# Patient Record
Sex: MALE | Race: OTHER | NOT HISPANIC OR LATINO | URBAN - METROPOLITAN AREA
[De-identification: names, ages, dates, MRNs, and addresses within clinical notes are randomized per-mention and may not be internally consistent; named-entity substitution may affect disease eponyms.]

---

## 2020-09-11 ENCOUNTER — EMERGENCY (EMERGENCY)
Facility: HOSPITAL | Age: 20
LOS: 1 days | Discharge: HOME | End: 2020-09-11
Attending: EMERGENCY MEDICINE | Admitting: EMERGENCY MEDICINE
Payer: COMMERCIAL

## 2020-09-11 VITALS
DIASTOLIC BLOOD PRESSURE: 79 MMHG | TEMPERATURE: 99 F | OXYGEN SATURATION: 98 % | HEART RATE: 76 BPM | HEIGHT: 66 IN | RESPIRATION RATE: 20 BRPM | SYSTOLIC BLOOD PRESSURE: 120 MMHG | WEIGHT: 130.07 LBS

## 2020-09-11 VITALS
HEART RATE: 85 BPM | OXYGEN SATURATION: 98 % | DIASTOLIC BLOOD PRESSURE: 59 MMHG | SYSTOLIC BLOOD PRESSURE: 101 MMHG | RESPIRATION RATE: 17 BRPM

## 2020-09-11 DIAGNOSIS — T78.1XXA OTHER ADVERSE FOOD REACTIONS, NOT ELSEWHERE CLASSIFIED, INITIAL ENCOUNTER: ICD-10-CM

## 2020-09-11 LAB
ALBUMIN SERPL ELPH-MCNC: 4.6 G/DL — SIGNIFICANT CHANGE UP (ref 3.3–5)
ALP SERPL-CCNC: 95 U/L — SIGNIFICANT CHANGE UP (ref 40–120)
ALT FLD-CCNC: 49 U/L — HIGH (ref 10–45)
ANION GAP SERPL CALC-SCNC: 11 MMOL/L — SIGNIFICANT CHANGE UP (ref 5–17)
AST SERPL-CCNC: 31 U/L — SIGNIFICANT CHANGE UP (ref 10–40)
BASOPHILS # BLD AUTO: 0.02 K/UL — SIGNIFICANT CHANGE UP (ref 0–0.2)
BASOPHILS NFR BLD AUTO: 0.3 % — SIGNIFICANT CHANGE UP (ref 0–2)
BILIRUB SERPL-MCNC: 0.8 MG/DL — SIGNIFICANT CHANGE UP (ref 0.2–1.2)
BUN SERPL-MCNC: 11 MG/DL — SIGNIFICANT CHANGE UP (ref 7–23)
CALCIUM SERPL-MCNC: 9.4 MG/DL — SIGNIFICANT CHANGE UP (ref 8.4–10.5)
CHLORIDE SERPL-SCNC: 102 MMOL/L — SIGNIFICANT CHANGE UP (ref 96–108)
CO2 SERPL-SCNC: 29 MMOL/L — SIGNIFICANT CHANGE UP (ref 22–31)
CREAT SERPL-MCNC: 0.96 MG/DL — SIGNIFICANT CHANGE UP (ref 0.5–1.3)
EOSINOPHIL # BLD AUTO: 0.17 K/UL — SIGNIFICANT CHANGE UP (ref 0–0.5)
EOSINOPHIL NFR BLD AUTO: 2.1 % — SIGNIFICANT CHANGE UP (ref 0–6)
GLUCOSE SERPL-MCNC: 92 MG/DL — SIGNIFICANT CHANGE UP (ref 70–99)
HCT VFR BLD CALC: 44.8 % — SIGNIFICANT CHANGE UP (ref 39–50)
HGB BLD-MCNC: 15 G/DL — SIGNIFICANT CHANGE UP (ref 13–17)
IMM GRANULOCYTES NFR BLD AUTO: 0.3 % — SIGNIFICANT CHANGE UP (ref 0–1.5)
LYMPHOCYTES # BLD AUTO: 2.66 K/UL — SIGNIFICANT CHANGE UP (ref 1–3.3)
LYMPHOCYTES # BLD AUTO: 33.5 % — SIGNIFICANT CHANGE UP (ref 13–44)
MCHC RBC-ENTMCNC: 29.9 PG — SIGNIFICANT CHANGE UP (ref 27–34)
MCHC RBC-ENTMCNC: 33.5 GM/DL — SIGNIFICANT CHANGE UP (ref 32–36)
MCV RBC AUTO: 89.4 FL — SIGNIFICANT CHANGE UP (ref 80–100)
MONOCYTES # BLD AUTO: 0.51 K/UL — SIGNIFICANT CHANGE UP (ref 0–0.9)
MONOCYTES NFR BLD AUTO: 6.4 % — SIGNIFICANT CHANGE UP (ref 2–14)
NEUTROPHILS # BLD AUTO: 4.55 K/UL — SIGNIFICANT CHANGE UP (ref 1.8–7.4)
NEUTROPHILS NFR BLD AUTO: 57.4 % — SIGNIFICANT CHANGE UP (ref 43–77)
NRBC # BLD: 0 /100 WBCS — SIGNIFICANT CHANGE UP (ref 0–0)
PLATELET # BLD AUTO: 242 K/UL — SIGNIFICANT CHANGE UP (ref 150–400)
POTASSIUM SERPL-MCNC: 4.2 MMOL/L — SIGNIFICANT CHANGE UP (ref 3.5–5.3)
POTASSIUM SERPL-SCNC: 4.2 MMOL/L — SIGNIFICANT CHANGE UP (ref 3.5–5.3)
PROT SERPL-MCNC: 7.4 G/DL — SIGNIFICANT CHANGE UP (ref 6–8.3)
RBC # BLD: 5.01 M/UL — SIGNIFICANT CHANGE UP (ref 4.2–5.8)
RBC # FLD: 13 % — SIGNIFICANT CHANGE UP (ref 10.3–14.5)
SODIUM SERPL-SCNC: 142 MMOL/L — SIGNIFICANT CHANGE UP (ref 135–145)
WBC # BLD: 7.93 K/UL — SIGNIFICANT CHANGE UP (ref 3.8–10.5)
WBC # FLD AUTO: 7.93 K/UL — SIGNIFICANT CHANGE UP (ref 3.8–10.5)

## 2020-09-11 PROCEDURE — 96374 THER/PROPH/DIAG INJ IV PUSH: CPT

## 2020-09-11 PROCEDURE — 99291 CRITICAL CARE FIRST HOUR: CPT | Mod: 25

## 2020-09-11 PROCEDURE — 85025 COMPLETE CBC W/AUTO DIFF WBC: CPT

## 2020-09-11 PROCEDURE — 96372 THER/PROPH/DIAG INJ SC/IM: CPT | Mod: XU

## 2020-09-11 PROCEDURE — 96375 TX/PRO/DX INJ NEW DRUG ADDON: CPT

## 2020-09-11 PROCEDURE — 36415 COLL VENOUS BLD VENIPUNCTURE: CPT

## 2020-09-11 PROCEDURE — 82962 GLUCOSE BLOOD TEST: CPT

## 2020-09-11 PROCEDURE — 99291 CRITICAL CARE FIRST HOUR: CPT

## 2020-09-11 PROCEDURE — 80053 COMPREHEN METABOLIC PANEL: CPT

## 2020-09-11 RX ORDER — DIPHENHYDRAMINE HCL 50 MG
50 CAPSULE ORAL ONCE
Refills: 0 | Status: COMPLETED | OUTPATIENT
Start: 2020-09-11 | End: 2020-09-11

## 2020-09-11 RX ORDER — DIPHENHYDRAMINE HCL 50 MG
1 CAPSULE ORAL
Qty: 15 | Refills: 0
Start: 2020-09-11 | End: 2020-09-15

## 2020-09-11 RX ORDER — DEXAMETHASONE 0.5 MG/5ML
10 ELIXIR ORAL ONCE
Refills: 0 | Status: COMPLETED | OUTPATIENT
Start: 2020-09-11 | End: 2020-09-11

## 2020-09-11 RX ORDER — EPINEPHRINE 0.3 MG/.3ML
0.3 INJECTION INTRAMUSCULAR; SUBCUTANEOUS ONCE
Refills: 0 | Status: COMPLETED | OUTPATIENT
Start: 2020-09-11 | End: 2020-09-11

## 2020-09-11 RX ORDER — SODIUM CHLORIDE 9 MG/ML
1000 INJECTION INTRAMUSCULAR; INTRAVENOUS; SUBCUTANEOUS ONCE
Refills: 0 | Status: COMPLETED | OUTPATIENT
Start: 2020-09-11 | End: 2020-09-11

## 2020-09-11 RX ORDER — EPINEPHRINE 0.3 MG/.3ML
0.3 INJECTION INTRAMUSCULAR; SUBCUTANEOUS
Qty: 0.3 | Refills: 0
Start: 2020-09-11 | End: 2020-09-11

## 2020-09-11 RX ORDER — FAMOTIDINE 10 MG/ML
1 INJECTION INTRAVENOUS
Qty: 10 | Refills: 0
Start: 2020-09-11 | End: 2020-09-15

## 2020-09-11 RX ORDER — FAMOTIDINE 10 MG/ML
20 INJECTION INTRAVENOUS ONCE
Refills: 0 | Status: COMPLETED | OUTPATIENT
Start: 2020-09-11 | End: 2020-09-11

## 2020-09-11 RX ADMIN — EPINEPHRINE 0.3 MILLIGRAM(S): 0.3 INJECTION INTRAMUSCULAR; SUBCUTANEOUS at 18:17

## 2020-09-11 RX ADMIN — Medication 10 MILLIGRAM(S): at 18:16

## 2020-09-11 RX ADMIN — Medication 50 MILLIGRAM(S): at 18:17

## 2020-09-11 RX ADMIN — FAMOTIDINE 20 MILLIGRAM(S): 10 INJECTION INTRAVENOUS at 18:17

## 2020-09-11 RX ADMIN — SODIUM CHLORIDE 1000 MILLILITER(S): 9 INJECTION INTRAMUSCULAR; INTRAVENOUS; SUBCUTANEOUS at 18:17

## 2020-09-11 NOTE — ED PROVIDER NOTE - PATIENT PORTAL LINK FT
You can access the FollowMyHealth Patient Portal offered by [facility] by registering at the following website: [email or web address]. By joining [employer]’s FollowMyHealth portal, you will also be able to view your health information using other applications (apps) compatible with our system.

## 2020-09-11 NOTE — ED ADULT NURSE REASSESSMENT NOTE - NS ED NURSE REASSESS COMMENT FT1
Patient feeling better at this time , swelling subsided and redness on the face is gone ,safety maintained , will continue to monitor ,

## 2020-09-11 NOTE — ED ADULT NURSE NOTE - CHPI ED NUR SYMPTOMS NEG
no rash/no shortness of breath/no congestion/no difficulty swallowing/no difficulty breathing/no vomiting/no wheezing

## 2020-09-11 NOTE — ED ADULT TRIAGE NOTE - CHIEF COMPLAINT QUOTE
allergic reaction after eating [place] ~ 1 hour ago. Reported with throat itching and redness to face

## 2020-09-11 NOTE — ED PROVIDER NOTE - NSFOLLOWUPINSTRUCTIONS_ED_ALL_ED_FT
Allergies, Adult  An allergy is when your body's defense system (immune system) overreacts to an otherwise harmless substance (allergen) that you breathe in or eat or something that touches your skin. When you come into contact with something that you are allergic to, your immune system produces certain proteins (antibodies). These proteins cause cells to release chemicals (histamines) that trigger the symptoms of an allergic reaction.  Allergies often affect the nasal passages (allergic rhinitis), eyes (allergic conjunctivitis), skin (atopic dermatitis), and stomach. Allergies can be mild or severe. Allergies cannot spread from person to person (are not contagious). They can develop at any age and may be outgrown.  What increases the risk?  You may be at greater risk of allergies if other people in your family have allergies.  What are the signs or symptoms?  Symptoms depend on what type of allergy you have. They may include:  Runny, stuffy nose.Sneezing.Itchy mouth, ears, or throat.Postnasal drip.Sore throat.Itchy, red, watery, or puffy eyes.Skin rash or hives.Stomach pain.Vomiting.Diarrhea.Bloating.Wheezing or coughing.People with a severe allergy to food, medicine, or an insect bite may have a life-threatening allergic reaction (anaphylaxis). Symptoms of anaphylaxis include:  Hives.Itching.Flushed face.Swollen lips, tongue, or mouth.Tight or swollen throat.Chest pain or tightness in the chest.Trouble breathing or shortness of breath.Rapid heartbeat.Dizziness or fainting.Vomiting.Diarrhea.Pain in the abdomen.How is this diagnosed?  This condition is diagnosed based on:  Your symptoms.Your family and medical history.A physical exam.You may need to see a health care provider who specializes in treating allergies (allergist). You may also have tests, including:  Skin tests to see which allergens are causing your symptoms, such as:  Skin prick test. In this test, your skin is pricked with a tiny needle and exposed to small amounts of possible allergens to see if your skin reacts.Intradermal skin test. In this test, a small amount of allergen is injected under your skin to see if your skin reacts.Patch test. In this test, a small amount of allergen is placed on your skin and then your skin is covered with a bandage. Your health care provider will check your skin after a couple of days to see if a rash has developed.Blood tests.Challenges tests. In this test, you inhale a small amount of allergen by mouth to see if you have an allergic reaction.You may also be asked to:  Keep a food diary. A food diary is a record of all the foods and drinks you have in a day and any symptoms you experience.Practice an elimination diet. An elimination diet involves eliminating specific foods from your diet and then adding them back in one by one to find out if a certain food causes an allergic reaction.How is this treated?  Treatment for allergies depends on your symptoms. Treatment may include:  Cold compresses to soothe itching and swelling.Eye drops.Nasal sprays.Using a saline spray or container (neti pot) to flush out the nose (nasal irrigation). These methods can help clear away mucus and keep the nasal passages moist.Using a humidifier.Oral antihistamines or other medicines to block allergic reaction and inflammation.Skin creams to treat rashes or itching.Diet changes to eliminate food allergy triggers.Repeated exposure to tiny amounts of allergens to build up a tolerance and prevent future allergic reactions (immunotherapy). These include:  Allergy shots.Oral treatment. This involves taking small doses of an allergen under the tongue (sublingual immunotherapy).Emergency epinephrine injection (auto-injector) in case of an allergic emergency. This is a self-injectable, pre-measured medicine that must be given within the first few minutes of a serious allergic reaction.

## 2020-09-11 NOTE — ED PROVIDER NOTE - ENMT, MLM
Airway patent, Nasal mucosa clear. Mouth with normal mucosa. Throat has no vesicles, no oropharyngeal exudates and uvula is midline.  no uvular swelling no lip or tongue swelling- no FOM swelling

## 2020-09-11 NOTE — ED PROVIDER NOTE - OBJECTIVE STATEMENT
19 yo male with hx prior anaphylaxis 2 years ago ? nut allergy  never confirmed -  no intub  no hx asthma  now with 30 min of facial erythema after eating [place] - was in [facility] -no lip or tongue swelling  nl voice -no wheezing

## 2020-09-11 NOTE — ED PROVIDER NOTE - CLINICAL SUMMARY MEDICAL DECISION MAKING FREE TEXT BOX
19 yo male with prior hx of anaphylaxis 2 years ago now with 30 min of facial erythema pruritis  after eating [place]--no wheezing or rash no uvular or lip or tongue swelling nl voice  epi steroids benedryl will observe

## 2020-09-11 NOTE — ED ADULT NURSE NOTE - OBJECTIVE STATEMENT
patient alert and oriented x3 came for allergic reaction 2 hrs after having [place] not known ing it has nuts . Pt. felt his throat is itchy and facial redness with burning sensation  , lips swelling . Denies any chest pain , sob nor throat closing . Pt. able to speak with full sentences , no drooling noted . Pt. was upgraded to Dr. Bond , medicated as ordered. Will continue to monitor .

## 2020-09-11 NOTE — ED ADULT NURSE NOTE - CAS TRG GEN SKIN CONDITION
Pt assisted back to bed from chair x2 assist, cleaned, and turned to right side. Respirations even and unlabored on 3L HF nc. [place] patent and draining yellow urine. Family at bedside. Bed low and locked, door open. Warm

## 2020-09-11 NOTE — ED PROVIDER NOTE - CARE PROVIDER_API CALL
Amparo Cabrales  ALLERGY AND IMMUNOLOGY  [address], [address]  [place] [address]  Phone: [phone]  Fax: [phone]  Follow Up Time: 1-3 Days